# Patient Record
Sex: FEMALE | URBAN - METROPOLITAN AREA
[De-identification: names, ages, dates, MRNs, and addresses within clinical notes are randomized per-mention and may not be internally consistent; named-entity substitution may affect disease eponyms.]

---

## 2022-11-08 ENCOUNTER — APPOINTMENT (OUTPATIENT)
Dept: URBAN - METROPOLITAN AREA CLINIC 314 | Age: 67
Setting detail: DERMATOLOGY
End: 2022-11-08

## 2022-11-08 DIAGNOSIS — L71.0 PERIORAL DERMATITIS: ICD-10-CM

## 2022-11-08 PROCEDURE — OTHER PRESCRIPTION MEDICATION MANAGEMENT: OTHER

## 2022-11-08 PROCEDURE — OTHER COUNSELING: OTHER

## 2022-11-08 PROCEDURE — 99203 OFFICE O/P NEW LOW 30 MIN: CPT

## 2022-11-08 PROCEDURE — OTHER MIPS QUALITY: OTHER

## 2022-11-08 PROCEDURE — OTHER PRESCRIPTION: OTHER

## 2022-11-08 RX ORDER — TACROLIMUS 1 MG/G
OINTMENT TOPICAL BID
Qty: 30 | Refills: 3 | Status: ERX | COMMUNITY
Start: 2022-11-08

## 2022-11-08 RX ORDER — PIMECROLIMUS 10 MG/G
CREAM TOPICAL BID
Qty: 30 | Refills: 3 | Status: CANCELLED
Stop reason: CLARIF

## 2022-11-08 ASSESSMENT — LOCATION DETAILED DESCRIPTION DERM: LOCATION DETAILED: LEFT INFERIOR MEDIAL MALAR CHEEK

## 2022-11-08 ASSESSMENT — LOCATION SIMPLE DESCRIPTION DERM: LOCATION SIMPLE: LEFT CHEEK

## 2022-11-08 ASSESSMENT — LOCATION ZONE DERM: LOCATION ZONE: FACE

## 2022-11-08 NOTE — PROCEDURE: PRESCRIPTION MEDICATION MANAGEMENT
Render In Strict Bullet Format?: No
Initiate Treatment: Protopic 0.1 % topical ointment BID
Detail Level: Zone

## 2022-11-08 NOTE — HPI: BUMPS
Additional History: Pt want to a dermatologist and never gotten an answer for the bumps. Pt was using Plexion face wash and a retinoid.

## 2022-11-22 ENCOUNTER — APPOINTMENT (OUTPATIENT)
Dept: URBAN - METROPOLITAN AREA CLINIC 314 | Age: 67
Setting detail: DERMATOLOGY
End: 2022-11-28

## 2022-11-22 DIAGNOSIS — L71.0 PERIORAL DERMATITIS: ICD-10-CM

## 2022-11-22 PROCEDURE — OTHER PRESCRIPTION: OTHER

## 2022-11-22 PROCEDURE — 99214 OFFICE O/P EST MOD 30 MIN: CPT

## 2022-11-22 PROCEDURE — OTHER COUNSELING: OTHER

## 2022-11-22 PROCEDURE — OTHER PRESCRIPTION MEDICATION MANAGEMENT: OTHER

## 2022-11-22 RX ORDER — MINOCYCLINE HYDROCHLORIDE 100 MG/1
CAPSULE ORAL BID
Qty: 60 | Refills: 1 | Status: ERX | COMMUNITY
Start: 2022-11-22

## 2022-11-22 ASSESSMENT — LOCATION ZONE DERM: LOCATION ZONE: FACE

## 2022-11-22 ASSESSMENT — LOCATION DETAILED DESCRIPTION DERM: LOCATION DETAILED: LEFT INFERIOR MEDIAL MALAR CHEEK

## 2022-11-22 ASSESSMENT — LOCATION SIMPLE DESCRIPTION DERM: LOCATION SIMPLE: LEFT CHEEK

## 2022-11-22 NOTE — PROCEDURE: PRESCRIPTION MEDICATION MANAGEMENT
Detail Level: Zone
Continue Regimen: -\\nProtopic 0.1 % topical ointment BID
Plan: -\\nPt revealed that her rash was itchy, a new symptom. She agreed to try minocycline b/c of her adverse reaction to doxycycline (nausea).
Render In Strict Bullet Format?: No
Initiate Treatment: -\\nminocycline 100 mg capsule BID